# Patient Record
Sex: MALE | Race: BLACK OR AFRICAN AMERICAN | Employment: OTHER | URBAN - NONMETROPOLITAN AREA
[De-identification: names, ages, dates, MRNs, and addresses within clinical notes are randomized per-mention and may not be internally consistent; named-entity substitution may affect disease eponyms.]

---

## 2022-05-27 ENCOUNTER — TELEPHONE (OUTPATIENT)
Dept: NEUROSURGERY | Age: 42
End: 2022-05-27

## 2022-05-27 NOTE — TELEPHONE ENCOUNTER
1st attempt to call patient to schedule appointment left voicemail with call back number [phone]

## 2022-05-31 NOTE — TELEPHONE ENCOUNTER
Wendy Salazar called to schedule an appointment with Dr. Moreno Camp for Carpal Tunnel and I asked patient if he has had a NCS and he stated no, I explained to patient he will need that testing before he can see Dr. Moreno Camp, I explained to patient that I would call Dr. Carlos Bernstein office to let them know. Patient voiced understanding.     I called Dr. Ino Tian office at [phone] and [facility] stating that patient will need NCS before seeing Dr. Moreno Camp and if they had any questions please contact our office at [phone]

## 2022-06-15 NOTE — TELEPHONE ENCOUNTER
Referring provider called and stated that their office does not place the order for NCS. I explained that in order for pt to been seen here, he will need the NCS. She asked why we could not placed that order. I stated that since pt has never been seen here, we cannot place any orders. She VU to place NCS order and fax order to us.

## 2022-07-08 ENCOUNTER — TELEPHONE (OUTPATIENT)
Dept: NEUROSURGERY | Age: 42
End: 2022-07-08

## 2022-07-08 NOTE — TELEPHONE ENCOUNTER
Patient missed his NCS today. He will need that test for his appt with Dr Daryle Staple. I called pt to ask why he missed appt today and he was very confused with appts. I stated that it was probably miscommunication on everyone's part. I stated that I could transfer him to scheduling at 2121 so he can r/s for a time that works for him. I stated that I will have to cancel appt for 7/11/22 until after NCS. Pt call transferred and appt cancelled.

## 2022-08-11 ENCOUNTER — HOSPITAL ENCOUNTER (OUTPATIENT)
Dept: NEUROLOGY | Age: 42
Discharge: HOME | End: 2022-08-11
Payer: MEDICAID

## 2022-08-11 DIAGNOSIS — G56.01 CARPAL TUNNEL SYNDROME, RIGHT UPPER LIMB: ICD-10-CM

## 2022-08-11 PROBLEM — R20.0 NUMBNESS: Status: ACTIVE | Noted: 2022-08-11

## 2022-08-11 PROCEDURE — 95886 MUSC TEST DONE W/N TEST COMP: CPT

## 2022-08-11 PROCEDURE — 95886 MUSC TEST DONE W/N TEST COMP: CPT | Performed by: PSYCHIATRY & NEUROLOGY

## 2022-08-11 PROCEDURE — 95909 NRV CNDJ TST 5-6 STUDIES: CPT

## 2022-08-11 PROCEDURE — 95909 NRV CNDJ TST 5-6 STUDIES: CPT | Performed by: PSYCHIATRY & NEUROLOGY
